# Patient Record
Sex: FEMALE | Race: WHITE | NOT HISPANIC OR LATINO | Employment: OTHER | URBAN - NONMETROPOLITAN AREA
[De-identification: names, ages, dates, MRNs, and addresses within clinical notes are randomized per-mention and may not be internally consistent; named-entity substitution may affect disease eponyms.]

---

## 2021-03-26 ENCOUNTER — HOSPITAL ENCOUNTER (EMERGENCY)
Facility: HOSPITAL | Age: 61
Discharge: LEFT BEFORE DISCHARGE | End: 2021-03-27

## 2021-03-26 PROCEDURE — 99211 OFF/OP EST MAY X REQ PHY/QHP: CPT

## 2021-03-27 ENCOUNTER — APPOINTMENT (OUTPATIENT)
Dept: ULTRASOUND IMAGING | Facility: HOSPITAL | Age: 61
End: 2021-03-27

## 2021-03-27 VITALS
DIASTOLIC BLOOD PRESSURE: 75 MMHG | TEMPERATURE: 97.4 F | OXYGEN SATURATION: 98 % | BODY MASS INDEX: 36.29 KG/M2 | RESPIRATION RATE: 18 BRPM | WEIGHT: 245 LBS | SYSTOLIC BLOOD PRESSURE: 156 MMHG | HEART RATE: 71 BPM | HEIGHT: 69 IN

## 2021-03-27 NOTE — ED NOTES
Patient called to be roomed and not found in ED lobby or surrounding areas; called several times     Delicia Rosario, RN  03/27/21 [id number]